# Patient Record
Sex: MALE | Race: WHITE | NOT HISPANIC OR LATINO | Employment: OTHER | URBAN - METROPOLITAN AREA
[De-identification: names, ages, dates, MRNs, and addresses within clinical notes are randomized per-mention and may not be internally consistent; named-entity substitution may affect disease eponyms.]

---

## 2018-01-09 ENCOUNTER — APPOINTMENT (OUTPATIENT)
Dept: LAB | Facility: HOSPITAL | Age: 73
End: 2018-01-09
Attending: UROLOGY
Payer: MEDICARE

## 2018-01-09 ENCOUNTER — TRANSCRIBE ORDERS (OUTPATIENT)
Dept: ADMINISTRATIVE | Facility: HOSPITAL | Age: 73
End: 2018-01-09

## 2018-01-09 DIAGNOSIS — Z01.818 PREOP EXAMINATION: Primary | ICD-10-CM

## 2018-01-09 DIAGNOSIS — Z01.818 PREOP EXAMINATION: ICD-10-CM

## 2018-01-09 LAB
BILIRUB UR QL STRIP: NEGATIVE
CLARITY UR: CLEAR
COLOR UR: NORMAL
GLUCOSE UR STRIP-MCNC: NEGATIVE MG/DL
HGB UR QL STRIP.AUTO: NEGATIVE
KETONES UR STRIP-MCNC: NEGATIVE MG/DL
LEUKOCYTE ESTERASE UR QL STRIP: NEGATIVE
NITRITE UR QL STRIP: NEGATIVE
PH UR STRIP.AUTO: 6 [PH] (ref 5–9)
PROT UR STRIP-MCNC: NEGATIVE MG/DL
SP GR UR STRIP.AUTO: <=1.005 (ref 1–1.03)
UROBILINOGEN UR QL STRIP.AUTO: 0.2 E.U./DL

## 2018-01-09 PROCEDURE — 81003 URINALYSIS AUTO W/O SCOPE: CPT | Performed by: UROLOGY

## 2018-01-09 PROCEDURE — 87086 URINE CULTURE/COLONY COUNT: CPT

## 2018-01-09 RX ORDER — METOPROLOL SUCCINATE 100 MG/1
100 TABLET, EXTENDED RELEASE ORAL DAILY
COMMUNITY

## 2018-01-09 RX ORDER — ASPIRIN 81 MG/1
81 TABLET ORAL DAILY
COMMUNITY
End: 2018-01-12 | Stop reason: HOSPADM

## 2018-01-09 RX ORDER — LEVOTHYROXINE SODIUM 0.1 MG/1
100 TABLET ORAL DAILY
COMMUNITY

## 2018-01-09 RX ORDER — SIMVASTATIN 40 MG
40 TABLET ORAL
COMMUNITY

## 2018-01-09 RX ORDER — LISINOPRIL 2.5 MG/1
20 TABLET ORAL DAILY
COMMUNITY

## 2018-01-09 NOTE — H&P
History & Physical - [facility]  Sean Castro 81 y o  male MRN: [id number]  Unit/Bed#:  Encounter: [id number]    ASSESSMENT/PLAN:    Urethral stricture  6fr bulbar urethral stricture  · Direct vision internal urethrotomy of stricture    BPH  Hx of Greenlight PVP 2/11/13  Retention after CABG 11/15/17  · Monitor for retention after white removed from procedure      HISTORY OF PRESENT ILLNESS:  82 y/o male developed urinary retention after his CABG 11/15/17  Cystoscopy indicated a bulbar urethral stricture  Pt had decreased urinary stream and required to be dilated 12/07/17  Presents for treatment of his stricture disease  PAST MEDICAL HISTORY:  Past Medical History:   Diagnosis Date    [facility]     COPD [facility]chronic obstructive [facility] disease) [facility]     Coronary artery disease     Disease of thyroid gland     [facility]ophagus cancer ([facility] ) 1994    s/p laryngectomy with a permanent stoma    Hard to intubate     Hyperlipidemia     Hypertension        PAST SURGICAL HISTORY:  Past Surgical History:   Procedure Laterality Date    BACK SURGERY      CARDIAC SURGERY      CATARACT EXTRACTION      CORONARY ARTERY BYPASS GRAFT  11/15/18    double bypass    ESOPHAGEAL DILATION      2006    KNEE ARTHROSCOPY      x2  1965, 2001    LARYNGECTOMY  1994    LASER OF PROSTATE W/ GREEN LIGHT PVP      TONSILLECTOMY         ALLERGIES:  No Known Allergies    SOCIAL HISTORY:  History   Alcohol Use    Yes     Comment: occ     History   Drug Use No     History   Smoking Status    Former Smoker    Packs/day: 3 00    Years: 27 00    Types: Cigarettes    Quit date: 1/9/1994   Smokeless Tobacco    Never Used       FAMILY HISTORY:  History reviewed  No pertinent family history  MEDICATIONS:  No current facility-administered medications for this encounter       Current Outpatient Prescriptions:     aspirin (ECOTRIN LOW STRENGTH) 81 mg EC tablet, Take 81 mg by mouth daily Last dose 1/3/18, Disp: , Rfl:    levothyroxine 100 mcg tablet, Take 100 mcg by mouth daily, Disp: , Rfl:     lisinopril (ZESTRIL) 2 5 mg tablet, Take 2 5 mg by mouth daily, Disp: , Rfl:     metoprolol succinate (TOPROL-XL) 100 mg 24 hr tablet, Take 100 mg by mouth daily, Disp: , Rfl:     simvastatin (ZOCOR) 40 mg tablet, Take 40 mg by mouth daily at bedtime, Disp: , Rfl:     Review of Systems    PHYSICAL EXAM:  Physical Exam   Constitutional: He appears well-developed  HENT:   Head: Normocephalic  Has permanent tracheostomy   Cardiovascular: Normal rate and regular rhythm  Pulmonary/Chest: Effort normal and breath sounds normal    Abdominal: Soft  Neurological: He is alert  Skin: Skin is warm  LAB RESULTS:  No results found for: WBC, HGB, HCT, MCV, PLT  No results found for: GLUCOSE, CALCIUM, NA, K, CO2, CL, BUN, CREATININE  No results found for: CALCIUM, PHOS  No results found for: PSA      Honey Nail, PA-C  01/09/18    Portions of the record may have been created with voice recognition software   Occasional wrong word or "sound a like" substitutions may have occurred due to the inherent limitations of voice recognition software   Read the chart carefully and recognize, using context, where substitutions have occurred

## 2018-01-10 LAB — BACTERIA UR CULT: NORMAL

## 2018-01-11 ENCOUNTER — ANESTHESIA EVENT (OUTPATIENT)
Dept: PERIOP | Facility: HOSPITAL | Age: 73
End: 2018-01-11
Payer: MEDICARE

## 2018-01-11 NOTE — ANESTHESIA PREPROCEDURE EVALUATION
Review of Systems/Medical History  Patient summary reviewed  Chart reviewed  History of anesthetic complications difficult airway    Cardiovascular  Hyperlipidemia, Hypertension , CAD, , History of CABG,   Comment: CARDIAC SURGERY   CORONARY ARTERY BYPASS GRAFT double bypass    ,  Pulmonary  Smoker ex-smoker , COPD , ,   Comment: Esophagus cancer ([facility] [address] ) s/p laryngectomy with a permanent stoma       GI/Hepatic            Endo/Other  History of thyroid disease , hypothyroidism,      GYN       Hematology   Musculoskeletal  Obesity , Back pain ,        Neurology   Psychology           Physical Exam    Airway    Mallampati score: II  TM Distance: >3 FB  Neck ROM: full     Dental       Cardiovascular  Rhythm: regular, Rate: normal,     Pulmonary  Breath sounds clear to auscultation,     Other Findings  Pt with trachesotomy  No mouth breathing  Anesthesia Plan  ASA Score- 3     Anesthesia Type- general with ASA Monitors  Additional Monitors:   Airway Plan:         Plan Factors-    Induction- intravenous  Postoperative Plan-     Informed Consent- Anesthetic plan and risks discussed with patient

## 2018-01-12 ENCOUNTER — HOSPITAL ENCOUNTER (OUTPATIENT)
Facility: HOSPITAL | Age: 73
Setting detail: OUTPATIENT SURGERY
Discharge: HOME | End: 2018-01-12
Attending: UROLOGY | Admitting: UROLOGY
Payer: MEDICARE

## 2018-01-12 ENCOUNTER — ANESTHESIA (OUTPATIENT)
Dept: PERIOP | Facility: HOSPITAL | Age: 73
End: 2018-01-12
Payer: MEDICARE

## 2018-01-12 VITALS
HEART RATE: 78 BPM | OXYGEN SATURATION: 95 % | SYSTOLIC BLOOD PRESSURE: 118 MMHG | DIASTOLIC BLOOD PRESSURE: 67 MMHG | TEMPERATURE: 97.4 F | RESPIRATION RATE: 18 BRPM

## 2018-01-12 PROBLEM — N35.919 URETHRAL STRICTURE: Status: ACTIVE | Noted: 2018-01-12

## 2018-01-12 RX ORDER — PROPOFOL 10 MG/ML
INJECTION, EMULSION INTRAVENOUS AS NEEDED
Status: DISCONTINUED | OUTPATIENT
Start: 2018-01-12 | End: 2018-01-12 | Stop reason: SURG

## 2018-01-12 RX ORDER — FENTANYL CITRATE 50 UG/ML
INJECTION, SOLUTION INTRAMUSCULAR; INTRAVENOUS AS NEEDED
Status: DISCONTINUED | OUTPATIENT
Start: 2018-01-12 | End: 2018-01-12 | Stop reason: SURG

## 2018-01-12 RX ORDER — CEFADROXIL 500 MG/1
500 CAPSULE ORAL EVERY 12 HOURS SCHEDULED
Qty: 8 CAPSULE | Refills: 0 | Status: SHIPPED | OUTPATIENT
Start: 2018-01-12 | End: 2018-01-16

## 2018-01-12 RX ORDER — CEFADROXIL 500 MG/1
500 CAPSULE ORAL EVERY 12 HOURS
Qty: 6 CAPSULE | Refills: 0 | Status: SHIPPED | OUTPATIENT
Start: 2018-01-12 | End: 2018-01-16

## 2018-01-12 RX ORDER — SODIUM CHLORIDE 9 MG/ML
125 INJECTION, SOLUTION INTRAVENOUS CONTINUOUS
Status: DISCONTINUED | OUTPATIENT
Start: 2018-01-12 | End: 2018-01-12 | Stop reason: HOSPADM

## 2018-01-12 RX ORDER — CEFAZOLIN SODIUM 1 G/3ML
INJECTION, POWDER, FOR SOLUTION INTRAMUSCULAR; INTRAVENOUS AS NEEDED
Status: DISCONTINUED | OUTPATIENT
Start: 2018-01-12 | End: 2018-01-12 | Stop reason: SURG

## 2018-01-12 RX ORDER — MIDAZOLAM HYDROCHLORIDE 1 MG/ML
INJECTION INTRAMUSCULAR; INTRAVENOUS AS NEEDED
Status: DISCONTINUED | OUTPATIENT
Start: 2018-01-12 | End: 2018-01-12 | Stop reason: SURG

## 2018-01-12 RX ORDER — GLYCINE 1.5 G/100ML
SOLUTION IRRIGATION AS NEEDED
Status: DISCONTINUED | OUTPATIENT
Start: 2018-01-12 | End: 2018-01-12 | Stop reason: HOSPADM

## 2018-01-12 RX ORDER — METOPROLOL SUCCINATE 100 MG/1
100 TABLET, EXTENDED RELEASE ORAL DAILY
Status: DISCONTINUED | OUTPATIENT
Start: 2018-01-12 | End: 2018-01-12 | Stop reason: HOSPADM

## 2018-01-12 RX ORDER — EPHEDRINE SULFATE 50 MG/ML
INJECTION, SOLUTION INTRAVENOUS AS NEEDED
Status: DISCONTINUED | OUTPATIENT
Start: 2018-01-12 | End: 2018-01-12 | Stop reason: SURG

## 2018-01-12 RX ADMIN — MIDAZOLAM HYDROCHLORIDE 1 MG: 1 INJECTION, SOLUTION INTRAMUSCULAR; INTRAVENOUS at 07:36

## 2018-01-12 RX ADMIN — FENTANYL CITRATE 50 MCG: 50 INJECTION, SOLUTION INTRAMUSCULAR; INTRAVENOUS at 07:45

## 2018-01-12 RX ADMIN — EPHEDRINE SULFATE 10 MG: 50 INJECTION, SOLUTION INTRAMUSCULAR; INTRAVENOUS; SUBCUTANEOUS at 07:45

## 2018-01-12 RX ADMIN — CEFAZOLIN 1000 MG: 1 INJECTION, POWDER, FOR SOLUTION INTRAVENOUS at 07:29

## 2018-01-12 RX ADMIN — FENTANYL CITRATE 50 MCG: 50 INJECTION, SOLUTION INTRAMUSCULAR; INTRAVENOUS at 07:35

## 2018-01-12 RX ADMIN — PROPOFOL 50 MG: 10 INJECTION, EMULSION INTRAVENOUS at 07:40

## 2018-01-12 RX ADMIN — MIDAZOLAM HYDROCHLORIDE 1 MG: 1 INJECTION, SOLUTION INTRAMUSCULAR; INTRAVENOUS at 07:33

## 2018-01-12 RX ADMIN — SODIUM CHLORIDE: 0.9 INJECTION, SOLUTION INTRAVENOUS at 07:29

## 2018-01-12 NOTE — DISCHARGE INSTRUCTIONS
Please call if he develops fevers, chills, or significant blood in the catheter causing any obstruction

## 2018-01-12 NOTE — OP NOTE
OPERATIVE REPORT- Dr Fritz Ley  PATIENT NAME: Milbert Goldmann    :  1945  MRN: [id number]  Pt Location: [facility] OR ROOM 04    SURGERY DATE: 2018    Surgeon: Giancarlo Erickson MD    Pre-op Diagnosis:  1  Urethral Stricture    Post-op Diagnosis:  1  Same    Procedure:  1  Cystoscopy  2  Direct vision internal urethrotomy  3  Vásquez insertion    Specimen(s):   None    Estimated Blood Loss:    Minimal    Specimen(s):   None *    Drains:  Twenty Fr silastic catheter  Anesthesia Type:    IV Sedation with Anesthesia    Indications for Operation:  1  Urethral stricture  2  Voiding dysfunction  Findings:   A Proximal stricture was encountered  Procedure and Technique:   After obtaining consent and indentifying the patient, antibiotics were given as ordered and the patient was brought to the room  All appropriate leads and monitors were placed and the patient was appropriately positioned on the table  Anesthesia was administered and the patient was sterilely prepped and draped  A timeout was performed where the patient name, , procedure, antibiotics, allergies, etc were discussed  All in the room were satisfied before the start of the operative procedure  What follows are the operative findings and events  A DVIU Scope was inserted using an obturator  The obturator was exchanged for a 30 degree foroblique lens  Thereafter a cold-knife urethrotomy was performed at the 12:00 position  The scope was then carefully passed into the bladder with no significant findings noted  Prostate was not particularly obstructive after his prior PVP  Picture was taken to document this  Scope was withdrawn leaving the bladder filled  A 20 [place] silastic catheter was then passed safely into the bladder  Urinary return was noted  The catheter balloon was inflated with 10ml of sterile water and set to drainage    He was awakened from anesthesia having tolerated the procedure well and transferred to the PACU in satisfactory condition  Plan:   He will return to the office to undergo a voiding trial in the next couple days  He will be on antibiotics for four days  He will restart his aspirin in three days      SIGNATURE: Nagi Merritt MD  DATE: January 12, 2018  TIME: 7:35 AM

## 2018-01-12 NOTE — PERIOPERATIVE NURSING NOTE
Instruction given to patient and wife on using white catheter and changing to leg bag/ prescription for antibiotic obtained from Dr Bar Dumont and given to patients wife

## 2018-07-26 NOTE — H&P
History & Physical - [facility]  Delfino Salvage 81 y o  male MRN: [id number]  Unit/Bed#:  Encounter: [id number]    ASSESSMENT/PLAN:    Urethral stricture disease  Stricture at EUS  · Balloon dilation of stricture     HISTORY OF PRESENT ILLNESS:  82 y/o male with history of BPH s/p Greenlight PVP 2/11/13 and urinary retention after heart surgery 11/15/17  Pt was noted on cystoscopy to have a mild recurrence of the bulbar urethral stricture however, more concerning is his external urinary sphincter was impassible  Since we are not able to pass through with a flexible scope it was decided that he would need a rigid cystoscopy and possible balloon dilation of his EUS  PAST MEDICAL HISTORY:  Past Medical History:   Diagnosis Date    [facility]     [facility] ([facility] )     Coronary artery disease     Disease of thyroid gland     Esophagus cancer ([facility] ) 1994    s/p laryngectomy with a permanent stoma    Hard to intubate     Hyperlipidemia     Hypertension        PAST SURGICAL HISTORY:  Past Surgical History:   Procedure Laterality Date    BACK SURGERY      CARDIAC SURGERY      CATARACT EXTRACTION      CORONARY ARTERY BYPASS GRAFT  11/15/18    double bypass    ESOPHAGEAL DILATION      2006    KNEE ARTHROSCOPY      x2  1965, 2001    LARYNGECTOMY  1994    LASER OF PROSTATE W/ GREEN LIGHT PVP      TONSILLECTOMY      URETHROTOMY N/A 1/12/2018    Procedure: DIRECT VISUAL INTERAL URETROTOMY (DVIU); Surgeon: Melissa Eric MD;  Location: [facility];  Service: Urology       ALLERGIES:  No Known Allergies    SOCIAL HISTORY:  History   Alcohol Use    Yes     Comment: occ     History   Drug Use No     History   Smoking Status    Former Smoker    Packs/day: 3 00    Years: 27 00    Types: Cigarettes    Quit date: 1/9/1994   Smokeless Tobacco    Never Used       FAMILY HISTORY:  No family history on file      MEDICATIONS:  No current facility-administered medications for this encounter  Current Outpatient Prescriptions:     levothyroxine 100 mcg tablet, Take 100 mcg by mouth daily, Disp: , Rfl:     lisinopril (ZESTRIL) 2 5 mg tablet, Take 2 5 mg by mouth daily, Disp: , Rfl:     metoprolol succinate (TOPROL-XL) 100 mg 24 hr tablet, Take 100 mg by mouth daily, Disp: , Rfl:     simvastatin (ZOCOR) 40 mg tablet, Take 40 mg by mouth daily at bedtime, Disp: , Rfl:     Review of Systems    PHYSICAL EXAM:  Physical Exam   Constitutional: He appears well-developed  HENT:   Head: Normocephalic  Pulmonary/Chest: Effort normal    Abdominal: Soft  Neurological: He is alert  Skin: Skin is warm  Psychiatric: He has a normal mood and affect  LAB RESULTS:  No results found for: WBC, HGB, HCT, MCV, PLT  No results found for: GLUCOSE, CALCIUM, NA, K, CO2, CL, BUN, CREATININE  No results found for: CALCIUM, PHOS  No results found for: PSA        Anne Danielle PA-C  07/26/18    Portions of the record may have been created with voice recognition software   Occasional wrong word or "sound alike" substitutions may have occurred due to the inherent limitations of voice recognition software   Read the chart carefully and recognize, using context, where substitutions have occurred

## 2018-08-02 ENCOUNTER — APPOINTMENT (OUTPATIENT)
Dept: LAB | Facility: HOSPITAL | Age: 73
End: 2018-08-02
Attending: UROLOGY
Payer: MEDICARE

## 2018-08-02 ENCOUNTER — OFFICE VISIT (OUTPATIENT)
Dept: LAB | Facility: HOSPITAL | Age: 73
End: 2018-08-02
Attending: UROLOGY
Payer: MEDICARE

## 2018-08-02 ENCOUNTER — TRANSCRIBE ORDERS (OUTPATIENT)
Dept: ADMINISTRATIVE | Facility: HOSPITAL | Age: 73
End: 2018-08-02

## 2018-08-02 ENCOUNTER — APPOINTMENT (OUTPATIENT)
Dept: PREADMISSION TESTING | Facility: HOSPITAL | Age: 73
End: 2018-08-02
Payer: MEDICARE

## 2018-08-02 DIAGNOSIS — Z01.818 PREOP EXAMINATION: ICD-10-CM

## 2018-08-02 DIAGNOSIS — Z01.818 PREOP EXAMINATION: Primary | ICD-10-CM

## 2018-08-02 LAB
ANION GAP SERPL CALCULATED.3IONS-SCNC: 8 MMOL/L (ref 4–13)
ATRIAL RATE: 56 BPM
BASOPHILS # BLD AUTO: 0.03 THOUSANDS/ΜL (ref 0–0.1)
BASOPHILS NFR BLD AUTO: 1 % (ref 0–1)
BILIRUB UR QL STRIP: NEGATIVE
BUN SERPL-MCNC: 18 MG/DL (ref 5–25)
CALCIUM SERPL-MCNC: 9.6 MG/DL (ref 8.3–10.1)
CHLORIDE SERPL-SCNC: 99 MMOL/L (ref 100–108)
CLARITY UR: CLEAR
CO2 SERPL-SCNC: 29 MMOL/L (ref 21–32)
COLOR UR: NORMAL
CREAT SERPL-MCNC: 0.91 MG/DL (ref 0.6–1.3)
EOSINOPHIL # BLD AUTO: 0.16 THOUSAND/ΜL (ref 0–0.61)
EOSINOPHIL NFR BLD AUTO: 2 % (ref 0–6)
ERYTHROCYTE [DISTWIDTH] IN BLOOD BY AUTOMATED COUNT: 14 % (ref 11.6–15.1)
GFR SERPL CREATININE-BSD FRML MDRD: 85 ML/MIN/1.73SQ M
GLUCOSE P FAST SERPL-MCNC: 93 MG/DL (ref 65–99)
GLUCOSE UR STRIP-MCNC: NEGATIVE MG/DL
HCT VFR BLD AUTO: 39.5 % (ref 36.5–49.3)
HGB BLD-MCNC: 12.7 G/DL (ref 12–17)
HGB UR QL STRIP.AUTO: NEGATIVE
IMM GRANULOCYTES # BLD AUTO: 0.02 THOUSAND/UL (ref 0–0.2)
IMM GRANULOCYTES NFR BLD AUTO: 0 % (ref 0–2)
KETONES UR STRIP-MCNC: NEGATIVE MG/DL
LEUKOCYTE ESTERASE UR QL STRIP: NEGATIVE
LYMPHOCYTES # BLD AUTO: 1.19 THOUSANDS/ΜL (ref 0.6–4.47)
LYMPHOCYTES NFR BLD AUTO: 18 % (ref 14–44)
MCH RBC QN AUTO: 28.2 PG (ref 26.8–34.3)
MCHC RBC AUTO-ENTMCNC: 32.2 G/DL (ref 31.4–37.4)
MCV RBC AUTO: 88 FL (ref 82–98)
MONOCYTES # BLD AUTO: 0.56 THOUSAND/ΜL (ref 0.17–1.22)
MONOCYTES NFR BLD AUTO: 9 % (ref 4–12)
NEUTROPHILS # BLD AUTO: 4.64 THOUSANDS/ΜL (ref 1.85–7.62)
NEUTS SEG NFR BLD AUTO: 70 % (ref 43–75)
NITRITE UR QL STRIP: NEGATIVE
NRBC BLD AUTO-RTO: 0 /100 WBCS
P AXIS: 39 DEGREES
PH UR STRIP.AUTO: 5.5 [PH] (ref 5–9)
PLATELET # BLD AUTO: 281 THOUSANDS/UL (ref 149–390)
PMV BLD AUTO: 9.7 FL (ref 8.9–12.7)
POTASSIUM SERPL-SCNC: 4.4 MMOL/L (ref 3.5–5.3)
PR INTERVAL: 158 MS
PROT UR STRIP-MCNC: NEGATIVE MG/DL
QRS AXIS: 14 DEGREES
QRSD INTERVAL: 92 MS
QT INTERVAL: 406 MS
QTC INTERVAL: 391 MS
RBC # BLD AUTO: 4.51 MILLION/UL (ref 3.88–5.62)
SODIUM SERPL-SCNC: 136 MMOL/L (ref 136–145)
SP GR UR STRIP.AUTO: <=1.005 (ref 1–1.03)
T WAVE AXIS: 55 DEGREES
UROBILINOGEN UR QL STRIP.AUTO: 0.2 E.U./DL
VENTRICULAR RATE: 56 BPM
WBC # BLD AUTO: 6.6 THOUSAND/UL (ref 4.31–10.16)

## 2018-08-02 PROCEDURE — 93005 ELECTROCARDIOGRAM TRACING: CPT

## 2018-08-02 PROCEDURE — 36415 COLL VENOUS BLD VENIPUNCTURE: CPT

## 2018-08-02 PROCEDURE — 80048 BASIC METABOLIC PNL TOTAL CA: CPT

## 2018-08-02 PROCEDURE — 87086 URINE CULTURE/COLONY COUNT: CPT

## 2018-08-02 PROCEDURE — 93010 ELECTROCARDIOGRAM REPORT: CPT | Performed by: INTERNAL MEDICINE

## 2018-08-02 PROCEDURE — 85025 COMPLETE CBC W/AUTO DIFF WBC: CPT

## 2018-08-02 PROCEDURE — 81003 URINALYSIS AUTO W/O SCOPE: CPT | Performed by: UROLOGY

## 2018-08-02 RX ORDER — ASPIRIN 81 MG/1
81 TABLET ORAL DAILY
COMMUNITY

## 2018-08-02 RX ORDER — ROSUVASTATIN CALCIUM 40 MG/1
40 TABLET, COATED ORAL DAILY
COMMUNITY

## 2018-08-03 ENCOUNTER — ANESTHESIA EVENT (OUTPATIENT)
Dept: PERIOP | Facility: HOSPITAL | Age: 73
End: 2018-08-03
Payer: MEDICARE

## 2018-08-03 LAB — BACTERIA UR CULT: NORMAL

## 2018-08-03 NOTE — ANESTHESIA PREPROCEDURE EVALUATION
Review of Systems/Medical History  Patient summary reviewed  Chart reviewed  History of anesthetic complications difficult airway    Cardiovascular  Hyperlipidemia, Hypertension , CAD ,    Pulmonary  Smoker ex-smoker  , COPD ,   Comment: s/p laryngectomy with a permanent stoma       GI/Hepatic      Comment: Esophagus cancer ([address] [address] ) s/p laryngectomy with a permanent stoma            Endo/Other  History of thyroid disease ,      GYN       Hematology   Musculoskeletal       Neurology   Psychology           Physical Exam    Airway    Mallampati score: II  TM Distance: >3 FB  Neck ROM: full     Dental       Cardiovascular  Rhythm: regular, Rate: normal,     Pulmonary  Breath sounds clear to auscultation,     Other Findings        Anesthesia Plan  ASA Score- 3     Anesthesia Type- general and IV sedation with anesthesia with ASA Monitors  Additional Monitors:   Airway Plan: LMA  Plan Factors-    Induction- intravenous  Postoperative Plan-     Informed Consent- Anesthetic plan and risks discussed with patient  I personally reviewed this patient with the CRNA  Discussed and agreed on the Anesthesia Plan with the CRNA  Tisha Mcarthur

## 2018-08-07 ENCOUNTER — HOSPITAL ENCOUNTER (OUTPATIENT)
Facility: HOSPITAL | Age: 73
Setting detail: OUTPATIENT SURGERY
Discharge: HOME | End: 2018-08-07
Attending: UROLOGY | Admitting: UROLOGY
Payer: MEDICARE

## 2018-08-07 ENCOUNTER — ANESTHESIA (OUTPATIENT)
Dept: PERIOP | Facility: HOSPITAL | Age: 73
End: 2018-08-07
Payer: MEDICARE

## 2018-08-07 VITALS
WEIGHT: 200 LBS | DIASTOLIC BLOOD PRESSURE: 55 MMHG | RESPIRATION RATE: 20 BRPM | SYSTOLIC BLOOD PRESSURE: 97 MMHG | TEMPERATURE: 96.2 F | BODY MASS INDEX: 32.28 KG/M2 | HEART RATE: 63 BPM | OXYGEN SATURATION: 98 %

## 2018-08-07 PROCEDURE — C1769 GUIDE WIRE: HCPCS | Performed by: UROLOGY

## 2018-08-07 PROCEDURE — C1726 CATH, BAL DIL, NON-VASCULAR: HCPCS | Performed by: UROLOGY

## 2018-08-07 RX ORDER — ONDANSETRON 2 MG/ML
4 INJECTION INTRAMUSCULAR; INTRAVENOUS ONCE AS NEEDED
Status: CANCELLED | OUTPATIENT
Start: 2018-08-07

## 2018-08-07 RX ORDER — PROPOFOL 10 MG/ML
INJECTION, EMULSION INTRAVENOUS AS NEEDED
Status: DISCONTINUED | OUTPATIENT
Start: 2018-08-07 | End: 2018-08-07 | Stop reason: SURG

## 2018-08-07 RX ORDER — SODIUM CHLORIDE, SODIUM LACTATE, POTASSIUM CHLORIDE, CALCIUM CHLORIDE 600; 310; 30; 20 MG/100ML; MG/100ML; MG/100ML; MG/100ML
75 INJECTION, SOLUTION INTRAVENOUS CONTINUOUS
Status: DISCONTINUED | OUTPATIENT
Start: 2018-08-07 | End: 2018-08-07 | Stop reason: HOSPADM

## 2018-08-07 RX ORDER — EPHEDRINE SULFATE 50 MG/ML
INJECTION, SOLUTION INTRAVENOUS AS NEEDED
Status: DISCONTINUED | OUTPATIENT
Start: 2018-08-07 | End: 2018-08-07 | Stop reason: SURG

## 2018-08-07 RX ORDER — LIDOCAINE HYDROCHLORIDE 10 MG/ML
INJECTION, SOLUTION INFILTRATION; PERINEURAL AS NEEDED
Status: DISCONTINUED | OUTPATIENT
Start: 2018-08-07 | End: 2018-08-07 | Stop reason: SURG

## 2018-08-07 RX ORDER — FENTANYL CITRATE/PF 50 MCG/ML
25 SYRINGE (ML) INJECTION
Status: CANCELLED | OUTPATIENT
Start: 2018-08-07

## 2018-08-07 RX ORDER — PROPOFOL 10 MG/ML
INJECTION, EMULSION INTRAVENOUS CONTINUOUS PRN
Status: DISCONTINUED | OUTPATIENT
Start: 2018-08-07 | End: 2018-08-07 | Stop reason: SURG

## 2018-08-07 RX ORDER — MAGNESIUM HYDROXIDE 1200 MG/15ML
LIQUID ORAL AS NEEDED
Status: DISCONTINUED | OUTPATIENT
Start: 2018-08-07 | End: 2018-08-07 | Stop reason: HOSPADM

## 2018-08-07 RX ORDER — FENTANYL CITRATE 50 UG/ML
INJECTION, SOLUTION INTRAMUSCULAR; INTRAVENOUS AS NEEDED
Status: DISCONTINUED | OUTPATIENT
Start: 2018-08-07 | End: 2018-08-07 | Stop reason: SURG

## 2018-08-07 RX ADMIN — PROPOFOL 80 MG: 10 INJECTION, EMULSION INTRAVENOUS at 13:43

## 2018-08-07 RX ADMIN — PROPOFOL 60 MCG/KG/MIN: 10 INJECTION, EMULSION INTRAVENOUS at 13:55

## 2018-08-07 RX ADMIN — LIDOCAINE HYDROCHLORIDE 20 MG: 10 INJECTION, SOLUTION INFILTRATION; PERINEURAL at 13:43

## 2018-08-07 RX ADMIN — EPHEDRINE SULFATE 10 MG: 50 INJECTION, SOLUTION INTRAMUSCULAR; INTRAVENOUS; SUBCUTANEOUS at 13:50

## 2018-08-07 RX ADMIN — CEFAZOLIN SODIUM 1000 MG: 1 SOLUTION INTRAVENOUS at 13:38

## 2018-08-07 RX ADMIN — SODIUM CHLORIDE, SODIUM LACTATE, POTASSIUM CHLORIDE, AND CALCIUM CHLORIDE 75 ML/HR: .6; .31; .03; .02 INJECTION, SOLUTION INTRAVENOUS at 12:25

## 2018-08-07 RX ADMIN — EPHEDRINE SULFATE 10 MG: 50 INJECTION, SOLUTION INTRAMUSCULAR; INTRAVENOUS; SUBCUTANEOUS at 13:49

## 2018-08-07 RX ADMIN — FENTANYL CITRATE 50 MCG: 50 INJECTION, SOLUTION INTRAMUSCULAR; INTRAVENOUS at 13:43

## 2018-08-07 NOTE — OP NOTE
OPERATIVE REPORT- Dr Araceli Saldaña  PATIENT NAME: Ailyn Goodwin    :  1945  MRN: [id number]  Pt Location: [facility] OR ROOM 04    SURGERY DATE: 2018    Surgeon: Iftikhar Kohler MD    Pre-op Diagnosis:  1  Urethral stricture, recurrent    Post-op Diagnosis:  1  Same    Procedure:  1  Urethral balloon dilation    Specimen(s): * No specimens in log *    Estimated Blood Loss: Minimal    Complications: None    Drains:  1  18 Azeri Councill catheter    Anesthesia type:   IV Sedation with Anesthesia    Indications for surgery:  1  Recurrent urethral stricture    Findings:  1  Recurrent urethral stricture    Procedure and Technique:   After obtaining consent and indentifying the patient, antibiotics were given as ordered and the patient was brought to the room  All appropriate leads and monitors were placed and the patient was appropriately positioned on the table  Anesthesia was administered and the patient was sterilely prepped and draped  A timeout was performed where the patient name, , procedure, antibiotics, allergies, etc were discussed  All in the room were satisfied before the start of the operative procedure  What follows are the operative findings and events  It was difficult to tell exactly where the obstruction was but I believe it was around the external urinary sphincter  A guidewire was gently passed through the obstruction and appeared to go easily into the bladder  X-ray was not present for confirmation  The dilating balloon was placed over the wire with a few mm sticking distal to the obstruction  The balloon was inflated to 12 atmospheres and held for 5 min by the clock  The balloon was then removed leaving the wire in place and cystoscopy was then performed indicating a good dilating result  The wire was used to place a Councill catheter  The catheter was inflated with 10 cc of sterile water and set to gravity drainage and he tolerated the procedure well    The procedure was terminated and the patient was awakened without incident and transferred to the PACU in satisfactory condition  Plan:  1  The catheter can be removed in a couple of days  He will need cystoscopy in three months  SIGNATURE: Marilee Bojorquez MD  DATE: August 7, 2018  TIME: 2:17 PM    Portions of the record may have been created with voice recognition software   Occasional wrong word or "sound alike" substitutions may have occurred due to the inherent limitations of voice recognition software   Read the chart carefully and recognize, using context, where substitutions have occurred

## 2018-08-07 NOTE — ANESTHESIA POSTPROCEDURE EVALUATION
Post-Op Assessment Note      CV Status:  Stable    Mental Status:  Alert and awake    Hydration Status:  Euvolemic    PONV Controlled:  Controlled    Airway Patency:  Patent    Post Op Vitals Reviewed: Yes          Staff: Anesthesiologist, CRNA           BP     Temp     Pulse     Resp      SpO2

## 2018-08-07 NOTE — INTERVAL H&P NOTE
Note reviewed and pt seen and examined with PA  Agree with assessment and plan  The risks, benefits, alternatives,and probabilities of success were discussed in detail with no guarantee made as to outcome  All questions were answered to the patient's satisfaction  Patient seen and examined  No changes in exam of heart and lungs  Consent confirmed

## 2018-08-07 NOTE — PERIOPERATIVE NURSING NOTE
White patent and draining clear yellow urine  Large bag switched to leg bag to go home   Reinforced white catheter care with patient and wife with demonstrations of same given

## 2021-02-13 DIAGNOSIS — Z23 ENCOUNTER FOR IMMUNIZATION: ICD-10-CM

## 2021-02-15 ENCOUNTER — IMMUNIZATIONS (OUTPATIENT)
Dept: FAMILY MEDICINE CLINIC | Facility: HOSPITAL | Age: 76
End: 2021-02-15

## 2021-02-15 DIAGNOSIS — Z23 ENCOUNTER FOR IMMUNIZATION: Primary | ICD-10-CM

## 2021-02-15 PROCEDURE — 0001A SARS-COV-2 / COVID-19 MRNA VACCINE (PFIZER-BIONTECH) 30 MCG: CPT | Performed by: PHYSICIAN ASSISTANT

## 2021-02-15 PROCEDURE — 91300 SARS-COV-2 / COVID-19 MRNA VACCINE (PFIZER-BIONTECH) 30 MCG: CPT | Performed by: PHYSICIAN ASSISTANT

## 2021-03-07 ENCOUNTER — IMMUNIZATIONS (OUTPATIENT)
Dept: FAMILY MEDICINE CLINIC | Facility: HOSPITAL | Age: 76
End: 2021-03-07

## 2021-03-07 DIAGNOSIS — Z23 ENCOUNTER FOR IMMUNIZATION: Primary | ICD-10-CM

## 2021-03-07 PROCEDURE — 0002A SARS-COV-2 / COVID-19 MRNA VACCINE (PFIZER-BIONTECH) 30 MCG: CPT

## 2021-03-07 PROCEDURE — 91300 SARS-COV-2 / COVID-19 MRNA VACCINE (PFIZER-BIONTECH) 30 MCG: CPT

## 2021-09-25 ENCOUNTER — IMMUNIZATIONS (OUTPATIENT)
Dept: FAMILY MEDICINE CLINIC | Facility: HOSPITAL | Age: 76
End: 2021-09-25

## 2021-09-25 DIAGNOSIS — Z23 ENCOUNTER FOR IMMUNIZATION: Primary | ICD-10-CM

## 2021-09-25 PROCEDURE — 0001A SARS-COV-2 / COVID-19 MRNA VACCINE (PFIZER-BIONTECH) 30 MCG: CPT

## 2021-09-25 PROCEDURE — 91300 SARS-COV-2 / COVID-19 MRNA VACCINE (PFIZER-BIONTECH) 30 MCG: CPT

## 2022-04-09 ENCOUNTER — IMMUNIZATIONS (OUTPATIENT)
Dept: FAMILY MEDICINE CLINIC | Facility: HOSPITAL | Age: 77
End: 2022-04-09

## 2022-04-09 PROCEDURE — 91305 COVID-19 PFIZER VACC TRIS-SUCROSE GRAY CAP 0.3 ML: CPT

## 2022-04-09 PROCEDURE — 0054A COVID-19 PFIZER VACC TRIS-SUCROSE GRAY CAP 0.3 ML: CPT

## 2022-07-21 ENCOUNTER — TELEPHONE (OUTPATIENT)
Dept: GASTROENTEROLOGY | Facility: AMBULARY SURGERY CENTER | Age: 77
End: 2022-07-21

## 2022-07-21 ENCOUNTER — TELEPHONE (OUTPATIENT)
Dept: GASTROENTEROLOGY | Facility: CLINIC | Age: 77
End: 2022-07-21

## 2022-07-21 NOTE — TELEPHONE ENCOUNTER
Can either of you look into this pts chart, and discuss with anesthesia to get their opinion? He is due for a colon  He is a "neck" breather  Hx of laryngeal cancer  He has always gone to [facility] in the past  With the new anesthesia group, will they let him go there? He said he needs a different type of mask for procedures and the only place in the past that "got it right" was [facility]  Pt would like to have procedure there  Can you look into this and get back to me so I can call pt back to  get him scheduled

## 2022-07-21 NOTE — TELEPHONE ENCOUNTER
Patients GI provider:  Dr Chantal Foote    Number to return call: [phone]     Reason for call: Pt calling stating he received a letter to call in to schedule due colonoscopy      Scheduled procedure/appointment date if applicable: Apt/procedure no upcoming appt

## 2022-07-22 NOTE — TELEPHONE ENCOUNTER
Message received back from [facility]  [9:22 AM] Marylee Aly I talked to anesthesia about patient Ayanna Howard [birth year] they said he is not able to come here ([facility]) for his procedure  We dont have the appropriate equipment if something were to go wrong to take care of him safely       I will call and update pt

## 2022-08-31 NOTE — TELEPHONE ENCOUNTER
Cardiac clearance received from Dr Grace Jefferson  Pt is cleared for procedure  I will have scanned  Thank you!

## 2022-09-12 ENCOUNTER — TELEPHONE (OUTPATIENT)
Dept: GASTROENTEROLOGY | Facility: CLINIC | Age: 77
End: 2022-09-12

## 2022-09-12 DIAGNOSIS — Z12.11 SCREEN FOR COLON CANCER: Primary | ICD-10-CM

## 2022-09-12 NOTE — TELEPHONE ENCOUNTER
Please send Golytely into pt's pharmacy asap as he has not received  I re-emailed him the Golytely instructions as it sounded like he had miralax w/ dul and he did not want to have to pay otc prices  Thank you so much for your help!

## 2022-09-16 ENCOUNTER — TELEPHONE (OUTPATIENT)
Dept: GASTROENTEROLOGY | Facility: CLINIC | Age: 77
End: 2022-09-16

## 2022-09-16 NOTE — TELEPHONE ENCOUNTER
[address] called stating they need Golytely script changed to [facility]

## 2022-09-16 NOTE — TELEPHONE ENCOUNTER
Please see other telephone encounter  I did leave message on machine informing pt that Mariano Manning was called into his pharmacy and that he can use the Golytely instructions for this prep  I asked him to please call back if he has any questions  Conversation: PREP  (Oldest Message First)    Juliana Freitas     [facility]    9/16/22 11:57 AM  Note  [facility] pharmacy called stating they need Golytely script changed to Mariano Camachol                 [facility]    9/16/22 11:58 AM  Najma Morelos MA routed this conversation to Benny Wilkes, CARLINE                 9/16/22 1:49 PM  Randy Walker RN routed this conversation to Benny Castillo PA-C     BT    9/16/22 2:15 PM  Note  Called it in to pharmacy verbally

## 2022-09-16 NOTE — TELEPHONE ENCOUNTER
Patients GI provider:  Dr Shweta Gomez    Number to return call: [phone]    Reason for call: Pt called in stating that his pharmacy is out of [place] [address] and is requesting to have something else sent over  Pt is requesting a call as soon as this is sent over so he is aware  He has to have someone assist him with picking this up and he is concerned since it is so close to his procedure date  Above is his number       Scheduled procedure/appointment date if applicable: procedure 3/46/36

## 2022-09-19 ENCOUNTER — ANESTHESIA EVENT (OUTPATIENT)
Dept: GASTROENTEROLOGY | Facility: HOSPITAL | Age: 77
End: 2022-09-19

## 2022-09-20 ENCOUNTER — HOSPITAL ENCOUNTER (OUTPATIENT)
Dept: GASTROENTEROLOGY | Facility: HOSPITAL | Age: 77
Setting detail: OUTPATIENT SURGERY
Discharge: HOME | End: 2022-09-20
Attending: INTERNAL MEDICINE
Payer: MEDICARE

## 2022-09-20 ENCOUNTER — ANESTHESIA (OUTPATIENT)
Dept: GASTROENTEROLOGY | Facility: HOSPITAL | Age: 77
End: 2022-09-20

## 2022-09-20 VITALS
HEART RATE: 91 BPM | SYSTOLIC BLOOD PRESSURE: 104 MMHG | WEIGHT: 185.1 LBS | DIASTOLIC BLOOD PRESSURE: 58 MMHG | BODY MASS INDEX: 29.75 KG/M2 | RESPIRATION RATE: 21 BRPM | HEIGHT: 66 IN | OXYGEN SATURATION: 96 % | TEMPERATURE: 97.7 F

## 2022-09-20 DIAGNOSIS — Z85.21 HX OF LARYNGEAL CANCER: ICD-10-CM

## 2022-09-20 DIAGNOSIS — Z86.010 HISTORY OF COLON POLYPS: ICD-10-CM

## 2022-09-20 PROCEDURE — 45380 COLONOSCOPY AND BIOPSY: CPT | Performed by: INTERNAL MEDICINE

## 2022-09-20 PROCEDURE — 88305 TISSUE EXAM BY PATHOLOGIST: CPT | Performed by: SPECIALIST

## 2022-09-20 RX ORDER — LIDOCAINE HYDROCHLORIDE 20 MG/ML
INJECTION, SOLUTION EPIDURAL; INFILTRATION; INTRACAUDAL; PERINEURAL AS NEEDED
Status: DISCONTINUED | OUTPATIENT
Start: 2022-09-20 | End: 2022-09-20

## 2022-09-20 RX ORDER — PROPOFOL 10 MG/ML
INJECTION, EMULSION INTRAVENOUS AS NEEDED
Status: DISCONTINUED | OUTPATIENT
Start: 2022-09-20 | End: 2022-09-20

## 2022-09-20 RX ORDER — EPHEDRINE SULFATE 50 MG/ML
INJECTION INTRAVENOUS AS NEEDED
Status: DISCONTINUED | OUTPATIENT
Start: 2022-09-20 | End: 2022-09-20

## 2022-09-20 RX ADMIN — PROPOFOL 30 MG: 10 INJECTION, EMULSION INTRAVENOUS at 11:17

## 2022-09-20 RX ADMIN — Medication 40 MG: at 11:12

## 2022-09-20 RX ADMIN — EPHEDRINE SULFATE 5 MG: 50 INJECTION, SOLUTION INTRAVENOUS at 11:10

## 2022-09-20 RX ADMIN — PROPOFOL 100 MG: 10 INJECTION, EMULSION INTRAVENOUS at 11:05

## 2022-09-20 RX ADMIN — PROPOFOL 30 MG: 10 INJECTION, EMULSION INTRAVENOUS at 11:11

## 2022-09-20 RX ADMIN — PROPOFOL 30 MG: 10 INJECTION, EMULSION INTRAVENOUS at 11:08

## 2022-09-20 RX ADMIN — LIDOCAINE HYDROCHLORIDE 100 MG: 20 INJECTION, SOLUTION EPIDURAL; INFILTRATION; INTRACAUDAL; PERINEURAL at 11:04

## 2022-09-20 RX ADMIN — PROPOFOL 30 MG: 10 INJECTION, EMULSION INTRAVENOUS at 11:14

## 2022-09-20 RX ADMIN — PROPOFOL 30 MG: 10 INJECTION, EMULSION INTRAVENOUS at 11:23

## 2022-09-20 RX ADMIN — EPHEDRINE SULFATE 5 MG: 50 INJECTION, SOLUTION INTRAVENOUS at 11:09

## 2022-09-20 RX ADMIN — EPHEDRINE SULFATE 5 MG: 50 INJECTION, SOLUTION INTRAVENOUS at 11:08

## 2022-09-20 RX ADMIN — PROPOFOL 30 MG: 10 INJECTION, EMULSION INTRAVENOUS at 11:20

## 2022-09-20 NOTE — ANESTHESIA POSTPROCEDURE EVALUATION
Post-Op Assessment Note    CV Status:  Stable  Pain Score: 0    Pain management: adequate     Mental Status:  Awake   Hydration Status:  Stable   PONV Controlled:  Controlled   Airway Patency:  Patent      Post Op Vitals Reviewed: Yes      Staff: CRNA         No complications documented      BP   110/59   Temp     Pulse  71   Resp   11   SpO2   98

## 2022-09-20 NOTE — INTERVAL H&P NOTE
H&P reviewed  After examining the patient I find no changes in the patients condition since the H&P had been written      Vitals:    09/20/22 0947   BP: 112/64   Pulse: 73   Resp: 14   Temp: (!) 97 4 °F (36 3 °C)   SpO2: 95%

## 2022-09-20 NOTE — H&P
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  REVIEW OF SYSTEMS: Per the HPI, and otherwise unremarkable  Historical Information   Past Medical History:   Diagnosis Date    [facility]     [facility]     Coronary artery disease     Disease of thyroid gland     [facility]     s/p laryngectomy with a permanent stoma    Hard to intubate     Hyperlipidemia     Hypertension      Past Surgical History:   Procedure Laterality Date    BACK SURGERY      CARDIAC SURGERY      CATARACT EXTRACTION      CORONARY ARTERY BYPASS GRAFT  11/15/18    double bypass    ESOPHAGEAL DILATION          KNEE ARTHROSCOPY      x2  1965,     LARYNGECTOMY      LASER OF PROSTATE W/ GREEN LIGHT PVP      IN CYSTOSCOPY,DIL URETHRAL STRICTURE N/A 2018    Procedure: Sheree Archuleta;  Surgeon: Licha Gil MD;  Location: [address] [place] [address];  Service: Urology    TONSILLECTOMY      URETHROTOMY N/A 2018    Procedure: DIRECT VISUAL INTERAL URETROTOMY (DVIU); Surgeon: Licha Gil MD;  Location: [facility];  Service: Urology     Social History   Social History     Substance and Sexual Activity   Alcohol Use Yes    Comment: occ     Social History     Substance and Sexual Activity   Drug Use No     Social History     Tobacco Use   Smoking Status Former Smoker    Packs/day: 3 00    Years: 27 00    Pack years: 81 00    Types: Cigarettes    Quit date: 1994    Years since quittin 7   Smokeless Tobacco Never Used     History reviewed  No pertinent family history      Meds/Allergies       Current Outpatient Medications:     aspirin (ECOTRIN LOW STRENGTH) 81 mg EC tablet    levothyroxine 100 mcg tablet    lisinopril (ZESTRIL) 2 5 mg tablet    metoprolol succinate (TOPROL-XL) 100 mg 24 hr tablet    rosuvastatin (CRESTOR) 40 MG tablet    bisacodyl (DULCOLAX) 5 mg EC tablet    simvastatin (ZOCOR) 40 mg tablet  No current facility-administered medications for this encounter  Facility-Administered Medications Ordered in Other Encounters:     lidocaine (PF) (XYLOCAINE-MPF) 2 % injection, , Intravenous, PRN, 100 mg at 09/20/22 1104    propofol (DIPRIVAN) 200 MG/20ML bolus injection, , Intravenous, PRN, 100 mg at 09/20/22 1105    Allergies   Allergen Reactions    Other Other (See Comments)     Per pt, allergic to a cardiac medication, but unsure of name  Per pt, cardiac medication affected liver  Objective     /64   Pulse 73   Temp (!) 97 4 °F (36 3 °C) (Temporal)   Resp 14   Ht 5' 6" (1 676 m)   Wt 84 kg (185 lb 1 6 oz)   SpO2 95%   BMI 29 88 kg/m²       PHYSICAL EXAM    Gen: NAD  Head: NCAT  CV: RRR  CHEST: Clear  ABD: soft, NT/ND  EXT: no edema      ASSESSMENT/PLAN:  This is a 78y o  year old male here for colonoscopy, and he is stable and optimized for his procedure

## 2022-09-20 NOTE — DISCHARGE SUMMARY
Discharge Summary - Jonh Felix 78 y o  male MRN: [id number]    Unit/Bed#:  Encounter: [id number]    Admission Date:  09/20/2022    Admitting Diagnosis: History of colon polyps [Z86 010]  Hx of laryngeal cancer [Z85 21]    HPI:  History of colon polyp  History of laryngeal cancer  Procedures Performed: No orders of the defined types were placed in this encounter  Summary of Hospital Course: Tolerated procedure well    Significant Findings, Care, Treatment and Services Provided:  Diverticulosis needed  Diminutive colon polyp removed  Complications:  None    Discharge Diagnosis:  See above    Medical Problems             Resolved Problems  Date Reviewed: 9/20/2022   None                 Condition at Discharge: good         Discharge instructions/Information to patient and family:   See after visit summary for information provided to patient and family  Provisions for Follow-Up Care:  See after visit summary for information related to follow-up care and any pertinent home health orders        PCP: Shiela Walter MD    Disposition: Home

## 2022-09-20 NOTE — ANESTHESIA PREPROCEDURE EVALUATION
Procedure:  COLONOSCOPY    Relevant Problems   ANESTHESIA  s/p laryngectomy with a permanent stoma   (+) Hard to intubate      CARDIO   (+) Coronary artery disease   (+) Hyperlipidemia   (+) Hypertension      ENDO   (+) Hypothyroidism   (-) Diabetes mellitus, type 2 (HCC)   (-) Hyperthyroidism      GI/HEPATIC   (+) Esophagus cancer (HCC)   (-) Gastroesophageal reflux disease      /RENAL (within normal limits)      HEMATOLOGY (within normal limits)      MUSCULOSKELETAL (within normal limits)      NEURO/PSYCH (within normal limits)      PULMONARY  s/p laryngectomy with a permanent stoma   (+) COPD (chronic obstructive pulmonary disease) (HCC)   (-) Asthma      Endocrine   (+) Disease of thyroid gland        Physical Exam    Airway  Comment: S/p total laryngectomy, well formed stoma with filter in place  Dental       Cardiovascular      Pulmonary      Other Findings        Anesthesia Plan  ASA Score- 3     Anesthesia Type- IV sedation with anesthesia with ASA Monitors  Additional Monitors:   Airway Plan:           Plan Factors-Exercise tolerance (METS): >4 METS  Chart reviewed  Existing labs reviewed  Patient summary reviewed  Patient is not a current smoker  Induction- intravenous  Postoperative Plan-     Informed Consent- Anesthetic plan and risks discussed with patient  I personally reviewed this patient with the CRNA  Discussed and agreed on the Anesthesia Plan with the CRNA  Robert Awad

## 2022-09-26 PROCEDURE — 88305 TISSUE EXAM BY PATHOLOGIST: CPT | Performed by: SPECIALIST
